# Patient Record
Sex: MALE | NOT HISPANIC OR LATINO
[De-identification: names, ages, dates, MRNs, and addresses within clinical notes are randomized per-mention and may not be internally consistent; named-entity substitution may affect disease eponyms.]

---

## 2018-03-16 ENCOUNTER — CHARTING TRANS (OUTPATIENT)
Dept: OTHER | Age: 58
End: 2018-03-16

## 2025-02-12 ENCOUNTER — OFFICE VISIT (OUTPATIENT)
Dept: NEUROLOGY | Facility: CLINIC | Age: 65
End: 2025-02-12
Payer: COMMERCIAL

## 2025-02-12 VITALS
RESPIRATION RATE: 16 BRPM | DIASTOLIC BLOOD PRESSURE: 80 MMHG | SYSTOLIC BLOOD PRESSURE: 142 MMHG | WEIGHT: 212 LBS | BODY MASS INDEX: 30.35 KG/M2 | HEART RATE: 84 BPM | HEIGHT: 70 IN

## 2025-02-12 DIAGNOSIS — R51.9 CHRONIC DAILY HEADACHE: Primary | ICD-10-CM

## 2025-02-12 PROCEDURE — 99204 OFFICE O/P NEW MOD 45 MIN: CPT | Performed by: OTHER

## 2025-02-12 RX ORDER — BUDESONIDE AND FORMOTEROL FUMARATE DIHYDRATE 80; 4.5 UG/1; UG/1
2 AEROSOL RESPIRATORY (INHALATION) 2 TIMES DAILY
COMMUNITY
Start: 2024-11-13 | End: 2025-11-08

## 2025-02-12 RX ORDER — ORAL SEMAGLUTIDE 14 MG/1
1 TABLET ORAL DAILY
COMMUNITY
Start: 2024-11-18

## 2025-02-12 RX ORDER — ROSUVASTATIN CALCIUM 10 MG/1
1 TABLET, COATED ORAL DAILY
COMMUNITY
Start: 2024-04-09

## 2025-02-12 RX ORDER — CHLORAL HYDRATE 500 MG
2 CAPSULE ORAL 2 TIMES DAILY
COMMUNITY

## 2025-02-12 RX ORDER — FLUTICASONE PROPIONATE 50 MCG
SPRAY, SUSPENSION (ML) NASAL
COMMUNITY
Start: 2024-12-22

## 2025-02-12 RX ORDER — METFORMIN HYDROCHLORIDE 500 MG/1
TABLET, EXTENDED RELEASE ORAL
COMMUNITY
Start: 2024-06-18

## 2025-02-12 RX ORDER — GABAPENTIN 100 MG/1
CAPSULE ORAL
Qty: 200 CAPSULE | Refills: 0 | Status: SHIPPED | OUTPATIENT
Start: 2025-02-12

## 2025-02-12 RX ORDER — GLIPIZIDE 10 MG/1
1 TABLET ORAL DAILY
COMMUNITY
Start: 2024-06-18

## 2025-02-12 NOTE — PROGRESS NOTES
[facility] [place]   Neurology    Kvng Faith Patient Status:  No patient class for patient encounter    1960 MRN [id number]   Location [facility], [facility], [place] PCP MIESHA PRYOR              HPI:   Kvng Faith is a(n) 64 year old male who presents at the request of MIESHA PRYOR for evaluation of headaches that started one year ago. Head feels full. Woke up one day, for a few hours had a hard time catching his breath. With it came brain fog, ringing in his ears, pressure in his ears, nausea, and headaches. These lasted several weeks, then individual symptoms started going away. Only headaches remained. Headaches have become chronic, are daily, but come and go. Has tried sinus and allergy medications, tylenol, NSAIDs, and has seen the chiropractor. Pains are always a small area the size of a quarter, are mild, can occur anywhere on his head, 2/10. Most are dull, occasionally sharp once every several days apart. Last a couple of seconds. Do not come in clusters. Can recur minutes or hours later. No aggravating or alleviating factors. The most frequent area involved is in the center of the forehead. No lacrimation, ptosis, congestion. Usually more central location. Do not wake him up. Tend to occur in the late morning or middle of the day. Eats 3 times daily. No scalp sensitivity. No vision change. Sleeps on average 7 hours. On average occurs 10 times a day.     Pertinent imaging and laboratory work-up:   CT head 3/8/24- no hemorrhage, ventricles normal size, normal grey/white matter differentiation    Past Medical History:  History reviewed. No pertinent past medical history.  Diabetes       Past Surgical History:  Past Surgical History:   Procedure Laterality Date    Cholecystectomy      Total knee replacement Right     partial       Family History:  family history is not on file.  No family history of headaches    Social History:   reports  that he has never smoked. He does not have any smokeless tobacco history on file. He reports that he does not drink alcohol and does not use drugs.    Allergies:  Allergies[1]    MEDICATIONS:    Current Outpatient Medications:     Budesonide-Formoterol Fumarate 80-4.5 MCG/ACT Inhalation Aerosol, Inhale 2 puffs into the lungs 2 (two) times daily., Disp: , Rfl:     fluticasone propionate 50 MCG/ACT Nasal Suspension, SHAKE LIQUID AND USE 1  SPRAY IN BOTH NOSTRILS  DAILY, Disp: , Rfl:     glipiZIDE 10 MG Oral Tab, Take 1 tablet (10 mg total) by mouth daily., Disp: , Rfl:     Glucose Blood In Vitro Strip, 1 each by Other route daily., Disp: , Rfl:     metFORMIN  MG Oral Tablet 24 Hr, Take 2 tablets twice a day by oral route for 90 days., Disp: , Rfl:     rosuvastatin 10 MG Oral Tab, Take 1 tablet (10 mg total) by mouth daily., Disp: , Rfl:     RYBELSUS 14 MG Oral Tab, Take 1 tablet by mouth daily., Disp: , Rfl:     gabapentin 100 MG Oral Cap, Day 1-5: take 1 tablet twice daily, Day 6-10: take 2 tabs twice daily, Day 11--20: if tolerating, increase to 3 tabs twice daily. If no improvement in headaches, increase to 3 tabs in am, 6 tabs in pm, and call for higher dose tablets, Disp: 200 capsule, Rfl: 0    omega-3 fatty acids 1000 MG Oral Cap, Take 2,000 mg by mouth 2 (two) times daily., Disp: , Rfl:       Review of Systems:   A comprehensive 10 point review of systems was completed.     Pertinent positives and negatives noted in the HPI.         PHYSICAL EXAM:   Neurological Exam  Vitals  Vitals:    02/12/25 0944   BP: 142/80   Pulse: 84   Resp: 16     General Appearance: Patient is a 64 year old male in no acute distress  Cardiac: Normal rate & regular rhythm  Skin: There are no rashes or other skin lesions.  Musculoskeletal: There is no scoliosis, or joint deformities  Neurologic examination:  Mental status: Patient is alert, attentive, and oriented x 3. Language is coherent and fluent without aphasia. Memory,  comprehension and ability to follow commands were intact.   Cranial nerves II-XII: Optic discs were sharp. Pupils were round and reacted to light. Extraocular movements were full. There was no face, jaw, palate or tongue weakness or atrophy. Facial sensation was normal. Hearing was grossly intact. Shoulder shrug was normal.   Motor exam revealed normal muscle bulk and tone. No atrophy or fasciculations. Manual muscle testing revealed MRC grade 5/5 strength throughout including proximal and distal muscles of the arms and legs.  Deep tendon reflexes were 2 at the biceps, brachioradialis, triceps, knee jerk, and ankle jerk. Plantar responses were flexor bilaterally.   Sensory exam revealed normal light touch perception. Vibratory perception and proprioception were intact at the toes. Pinprick and temperature were normal. Romberg sign was absent.  Complex motor skills revealed normal coordination. Finger-nose-finger intact.   Gait was narrow and stable, was able to walk on heels, toes and tandem without any difficulty.     ASSESSMENT/ACTIVE PROBLEM LIST:     Encounter Diagnosis   Name Primary?    Chronic daily headache Yes       Discussion/Plan:  Chronic daily brief headaches that are atypical, neuralgiform in that involve very small areas, but unlike nummular headache, keep changing locations. Does have a more frequent area that gets involved, mid frontal area.  No features of trigeminal autonomic cephalgia  CT head report reviewed, will scan in images  Possible atypical nummular headache  Start trial of gabapentin 100mg BID building to 300mg BID    Requested Prescriptions     Signed Prescriptions Disp Refills    gabapentin 100 MG Oral Cap 200 capsule 0     Sig: Day 1-5: take 1 tablet twice daily, Day 6-10: take 2 tabs twice daily, Day 11--20: if tolerating, increase to 3 tabs twice daily. If no improvement in headaches, increase to 3 tabs in am, 6 tabs in pm, and call for higher dose tablets          We discussed in  depth regarding the diagnosis, prognosis, treatment. The patient was given ample opportunity to ask questions. All questions and concerns were addressed.     Monika Jacome,   Neuromuscular and General Neurology  [facility]             [1] Not on File

## 2025-02-12 NOTE — PROGRESS NOTES
Here for a headache x one year. Began 12.2023 work up has relieved nothing. Head feels \"full\". Has tried sinus, allergy meds, tylenol, ibuprofen, chiropractor..

## 2025-02-12 NOTE — PATIENT INSTRUCTIONS
Refill policies:    Allow 2-3 business days for refills; controlled substances may take longer.  Contact your pharmacy at least 5 days prior to running out of medication and have them send an electronic request or submit request through the “request refill” option in your [employer] account.  Refills are not addressed on weekends; covering physicians do not authorize routine medications on weekends.  No narcotics or controlled substances are refilled after noon on Fridays or by on call physicians.  By law, narcotics must be electronically prescribed.  A 30 day supply with no refills is the maximum allowed.  If your prescription is due for a refill, you may be due for a follow up appointment.  To best provide you care, patients receiving routine medications need to be seen at least once a year.  Patients receiving narcotic/controlled substance medications need to be seen at least once every 3 months.  In the event that your preferred pharmacy does not have the requested medication in stock (e.g. Backordered), it is your responsibility to find another pharmacy that has the requested medication available.  We will gladly send a new prescription to that pharmacy at your request.    Scheduling Tests:    If your physician has ordered radiology tests such as MRI or CT scans, please contact Central Scheduling at [phone] right away to schedule the test.  Once scheduled, the [facility] Centralized Referral Team will work with your insurance carrier to obtain pre-certification or prior authorization.  Depending on your insurance carrier, approval may take 3-10 days.  It is highly recommended patients assure they have received an authorization before having a test performed.  If test is done without insurance authorization, patient may be responsible for the entire amount billed.      Precertification and Prior Authorizations:  If your physician has recommended that you have a procedure or additional testing performed the [facility]  Centralized Referral Team will contact your insurance carrier to obtain pre-certification or prior authorization.    You are strongly encouraged to contact your insurance carrier to verify that your procedure/test has been approved and is a COVERED benefit.  Although the [facility] Centralized Referral Team does its due diligence, the insurance carrier gives the disclaimer that \"Although the procedure is authorized, this does not guarantee payment.\"    Ultimately the patient is responsible for payment.   Thank you for your understanding in this matter.  Paperwork Completion:  If you require FMLA or disability paperwork for your recovery, please make sure to either drop it off or have it faxed to our office at [phone]. Be sure the form has your name and date of birth on it.  The form will be faxed to our Forms Department and they will complete it for you.  There is a 25$ fee for all forms that need to be filled out.  Please be aware there is a 10-14 day turnaround time.  You will need to sign a release of information (ANNIKA) form if your paperwork does not come with one.  You may call the Forms Department with any questions at [phone].  Their fax number is [phone].

## 2025-03-14 ENCOUNTER — TELEPHONE (OUTPATIENT)
Dept: NEUROLOGY | Facility: CLINIC | Age: 65
End: 2025-03-14

## 2025-03-14 NOTE — TELEPHONE ENCOUNTER
Received by patient an imaging disc. Disc would not upload for radiology and patient states he does not want disc back

## 2025-07-09 ENCOUNTER — OFFICE VISIT (OUTPATIENT)
Dept: NEUROLOGY | Facility: CLINIC | Age: 65
End: 2025-07-09
Payer: COMMERCIAL

## 2025-07-09 VITALS
DIASTOLIC BLOOD PRESSURE: 70 MMHG | HEART RATE: 64 BPM | RESPIRATION RATE: 16 BRPM | BODY MASS INDEX: 31 KG/M2 | SYSTOLIC BLOOD PRESSURE: 130 MMHG | WEIGHT: 216 LBS

## 2025-07-09 DIAGNOSIS — R42 EPISODIC LIGHTHEADEDNESS: ICD-10-CM

## 2025-07-09 DIAGNOSIS — R51.9 CHRONIC DAILY HEADACHE: Primary | ICD-10-CM

## 2025-07-09 PROCEDURE — 99214 OFFICE O/P EST MOD 30 MIN: CPT | Performed by: OTHER

## 2025-07-09 RX ORDER — TOPIRAMATE 25 MG/1
TABLET, FILM COATED ORAL
Qty: 120 TABLET | Refills: 0 | Status: SHIPPED | OUTPATIENT
Start: 2025-07-09

## 2025-07-09 NOTE — PROGRESS NOTES
Pt states he still feel pressure in head, not as intense and not as many headaches. Pt has been light headed

## 2025-07-09 NOTE — PATIENT INSTRUCTIONS
Refill policies:    Allow 2-3 business days for refills; controlled substances may take longer.  Contact your pharmacy at least 5 days prior to running out of medication and have them send an electronic request or submit request through the “request refill” option in your [employer] account.  Refills are not addressed on weekends; covering physicians do not authorize routine medications on weekends.  No narcotics or controlled substances are refilled after noon on Fridays or by on call physicians.  By law, narcotics must be electronically prescribed.  A 30 day supply with no refills is the maximum allowed.  If your prescription is due for a refill, you may be due for a follow up appointment.  To best provide you care, patients receiving routine medications need to be seen at least once a year.  Patients receiving narcotic/controlled substance medications need to be seen at least once every 3 months.  In the event that your preferred pharmacy does not have the requested medication in stock (e.g. Backordered), it is your responsibility to find another pharmacy that has the requested medication available.  We will gladly send a new prescription to that pharmacy at your request.    Scheduling Tests:    If your physician has ordered radiology tests such as MRI or CT scans, please contact Central Scheduling at [phone] right away to schedule the test.  Once scheduled, the [facility] Centralized Referral Team will work with your insurance carrier to obtain pre-certification or prior authorization.  Depending on your insurance carrier, approval may take 3-10 days.  It is highly recommended patients assure they have received an authorization before having a test performed.  If test is done without insurance authorization, patient may be responsible for the entire amount billed.      Precertification and Prior Authorizations:  If your physician has recommended that you have a procedure or additional testing performed the [facility]  Centralized Referral Team will contact your insurance carrier to obtain pre-certification or prior authorization.    You are strongly encouraged to contact your insurance carrier to verify that your procedure/test has been approved and is a COVERED benefit.  Although the [facility] Centralized Referral Team does its due diligence, the insurance carrier gives the disclaimer that \"Although the procedure is authorized, this does not guarantee payment.\"    Ultimately the patient is responsible for payment.   Thank you for your understanding in this matter.  Paperwork Completion:  If you require FMLA or disability paperwork for your recovery, please make sure to either drop it off or have it faxed to our office at [phone]. Be sure the form has your name and date of birth on it.  The form will be faxed to our Forms Department and they will complete it for you.  There is a 25$ fee for all forms that need to be filled out.  Please be aware there is a 10-14 day turnaround time.  You will need to sign a release of information (ANNIKA) form if your paperwork does not come with one.  You may call the Forms Department with any questions at [phone].  Their fax number is [phone].

## 2025-07-09 NOTE — PROGRESS NOTES
[facility]   Neurology    vKng Faith Patient Status:  No patient class for patient encounter    1960 MRN [id number]   Location [facility], [facility], [place] PCP MIESHA PRYOR              HPI:   Kvng Faith is a(n) 64 year old male who presents at the request of MIESHA PRYOR for evaluation of headaches that started one year ago. Head feels full. Woke up one day, for a few hours had a hard time catching his breath. With it came brain fog, ringing in his ears, pressure in his ears, nausea, and headaches. These lasted several weeks, then individual symptoms started going away. Only headaches remained. Headaches have become chronic, are daily, but come and go. Has tried sinus and allergy medications, tylenol, NSAIDs, and has seen the chiropractor. Pains are always a small area the size of a quarter, are mild, can occur anywhere on his head, 2/10. Most are dull, occasionally sharp once every several days apart. Last a couple of seconds. Do not come in clusters. Can recur minutes or hours later. No aggravating or alleviating factors. The most frequent area involved is in the center of the forehead. No lacrimation, ptosis, congestion. Usually more central location. Do not wake him up. Tend to occur in the late morning or middle of the day. Eats 3 times daily. No scalp sensitivity. No vision change. Sleeps on average 7 hours. On average occurs 10 times a day.   Interim  Since LOV, HA's have changed. Has a light constant pressure in the frontal area. It can expand for a couple of seconds, as a stronger pressure, 4/10, on the left more than right parietal regions. When it expands several times a day, he gets lightheaded with it. Doesn't wake him up. LH is not positional, only accompanies the intensified pressure.       Medications tried for headaches  Gabapentin- raised blood glucose    Pertinent imaging and laboratory work-up:   Cr- 0.93 2025  CT  head 3/8/24- no hemorrhage, ventricles normal size, normal grey/white matter differentiation    Past Medical History:  History reviewed. No pertinent past medical history.  Diabetes       Past Surgical History:  Past Surgical History:   Procedure Laterality Date    Cholecystectomy      Total knee replacement Right     partial       Family History:  family history is not on file.  No family history of headaches    Social History:   reports that he has never smoked. He has never used smokeless tobacco. He reports that he does not drink alcohol and does not use drugs.    Allergies:  Allergies[1]    MEDICATIONS:    Current Outpatient Medications:     topiramate 25 MG Oral Tab, Week 1: 1 tab at night, Week 2: 1 tab BID, Week 3: 1 tab in am, 2 tabs in pm, Week 4 and onward: 2 tabs BID, Disp: 120 tablet, Rfl: 0    Budesonide-Formoterol Fumarate 80-4.5 MCG/ACT Inhalation Aerosol, Inhale 2 puffs into the lungs 2 (two) times daily., Disp: , Rfl:     fluticasone propionate 50 MCG/ACT Nasal Suspension, SHAKE LIQUID AND USE 1  SPRAY IN BOTH NOSTRILS  DAILY, Disp: , Rfl:     glipiZIDE 10 MG Oral Tab, Take 1 tablet (10 mg total) by mouth daily., Disp: , Rfl:     metFORMIN  MG Oral Tablet 24 Hr, Take 2 tablets twice a day by oral route for 90 days., Disp: , Rfl:     omega-3 fatty acids 1000 MG Oral Cap, Take 2,000 mg by mouth 2 (two) times daily., Disp: , Rfl:     rosuvastatin 10 MG Oral Tab, Take 1 tablet (10 mg total) by mouth daily., Disp: , Rfl:     RYBELSUS 14 MG Oral Tab, Take 1 tablet by mouth daily., Disp: , Rfl:       Review of Systems:   A comprehensive 10 point review of systems was completed.     Pertinent positives and negatives noted in the HPI.         PHYSICAL EXAM:   Neurological Exam  Vitals  Vitals:    07/09/25 0837   BP: 130/70   Pulse: 64   Resp: 16       General Appearance: Patient is a 64 year old male in no acute distress  Cardiac: Normal rate & regular rhythm  Skin: There are no rashes or other skin  lesions.  Musculoskeletal: There is no scoliosis, or joint deformities  Neurologic examination:  Mental status: Patient is alert, attentive, and oriented x 3. Language is coherent and fluent without aphasia. Memory, comprehension and ability to follow commands were intact.   Cranial nerves II-XII: Optic discs were sharp. Pupils were round and reacted to light. Extraocular movements were full. There was no face, jaw, palate or tongue weakness or atrophy. Facial sensation was normal. Hearing was grossly intact. Shoulder shrug was normal.   Motor exam revealed normal muscle bulk and tone. No atrophy or fasciculations. Manual muscle testing revealed MRC grade 5/5 strength throughout including proximal and distal muscles of the arms and legs.  Deep tendon reflexes were 2 at the biceps, brachioradialis, triceps, knee jerk, and ankle jerk. Plantar responses were flexor bilaterally.   Sensory exam revealed normal light touch perception. Vibratory perception and proprioception were intact at the toes. Pinprick and temperature were normal. Romberg sign was absent.  Complex motor skills revealed normal coordination. Finger-nose-finger intact.   Gait was narrow and stable, was able to walk on heels, toes and tandem without any difficulty.     ASSESSMENT/ACTIVE PROBLEM LIST:     Encounter Diagnoses   Name Primary?    Chronic daily headache Yes    Episodic lightheadedness        Discussion/Plan:  Persistent headaches, worsened  Chronic daily brief headaches that are atypical, now have changed to having a baseline frontal pressure, with superimposed brief left more than right pressure parietal pains, and the superimposed pains are often associated with non-positional lightheadedness. Still no autonomic features.  Obtain MRI and MRA head rule out vascular or structural lesion  Less likely to be related to glucose changes, but see if any pattern- will be starting continuous glucose monitor  Start topamax building to 50mg BID  Side  effects with gabapentin    Requested Prescriptions     Signed Prescriptions Disp Refills    topiramate 25 MG Oral Tab 120 tablet 0     Sig: Week 1: 1 tab at night, Week 2: 1 tab BID, Week 3: 1 tab in am, 2 tabs in pm, Week 4 and onward: 2 tabs BID          We discussed in depth regarding the diagnosis, prognosis, treatment. The patient was given ample opportunity to ask questions. All questions and concerns were addressed.     Monika Jacome DO  Neuromuscular and General Neurology  [facility]                 [1] Not on File

## 2025-07-19 ENCOUNTER — PATIENT MESSAGE (OUTPATIENT)
Dept: NEUROLOGY | Facility: CLINIC | Age: 65
End: 2025-07-19

## 2025-08-07 ENCOUNTER — HOSPITAL ENCOUNTER (OUTPATIENT)
Dept: MRI IMAGING | Age: 65
Discharge: HOME | End: 2025-08-07
Attending: Other

## 2025-08-07 ENCOUNTER — HOSPITAL ENCOUNTER (OUTPATIENT)
Dept: CT IMAGING | Age: 65
Discharge: HOME | End: 2025-08-07
Attending: Other

## 2025-08-07 ENCOUNTER — APPOINTMENT (OUTPATIENT)
Dept: CT IMAGING | Age: 65
End: 2025-08-07
Attending: Other

## 2025-08-07 DIAGNOSIS — R51.9 CHRONIC DAILY HEADACHE: ICD-10-CM

## 2025-08-07 DIAGNOSIS — R42 EPISODIC LIGHTHEADEDNESS: ICD-10-CM

## 2025-08-07 LAB
CREAT BLD-MCNC: 0.8 MG/DL (ref 0.7–1.3)
EGFRCR SERPLBLD CKD-EPI 2021: 100 ML/MIN/1.73M2 (ref 60–?)

## 2025-08-07 PROCEDURE — 70553 MRI BRAIN STEM W/O & W/DYE: CPT | Performed by: OTHER

## 2025-08-07 PROCEDURE — A9575 INJ GADOTERATE MEGLUMI 0.1ML: HCPCS | Performed by: OTHER

## 2025-08-07 PROCEDURE — 70546 MR ANGIOGRAPH HEAD W/O&W/DYE: CPT | Performed by: OTHER

## 2025-08-07 PROCEDURE — 70496 CT ANGIOGRAPHY HEAD: CPT | Performed by: OTHER

## 2025-08-07 PROCEDURE — 82565 ASSAY OF CREATININE: CPT

## 2025-08-07 PROCEDURE — 70498 CT ANGIOGRAPHY NECK: CPT | Performed by: OTHER

## 2025-08-07 RX ORDER — GADOTERATE MEGLUMINE 376.9 MG/ML
20 INJECTION INTRAVENOUS
Status: COMPLETED | OUTPATIENT
Start: 2025-08-07 | End: 2025-08-07

## 2025-08-07 RX ADMIN — GADOTERATE MEGLUMINE 20 ML: 376.9 INJECTION INTRAVENOUS at 19:08:00

## 2025-08-09 ENCOUNTER — RESULTS FOLLOW-UP (OUTPATIENT)
Dept: NEUROLOGY | Facility: CLINIC | Age: 65
End: 2025-08-09

## 2025-08-19 RX ORDER — DIVALPROEX SODIUM 250 MG/1
TABLET, FILM COATED, EXTENDED RELEASE ORAL
Qty: 60 TABLET | Refills: 1 | Status: SHIPPED | OUTPATIENT
Start: 2025-08-19